# Patient Record
(demographics unavailable — no encounter records)

---

## 2024-10-10 NOTE — PHYSICAL EXAM

## 2024-10-10 NOTE — REVIEW OF SYSTEMS
[As Noted in HPI] : as noted in HPI [Arthralgias (joint pain)] : arthralgias [Negative] : Heme/Lymph [Abdominal Pain] : no abdominal pain [Constipation] : no constipation [Bloating (gassiness)] : bloating

## 2024-10-10 NOTE — HISTORY OF PRESENT ILLNESS
[de-identified] : 2017([facility]) gastritis 2023 gastritis [FreeTextEntry1] : 2014 ([facility]) diverticulosis 2023 divertciulosis

## 2024-10-10 NOTE — CONSULT LETTER
[Dear  ___] : Dear  [unfilled], [Consult Letter:] : I had the pleasure of evaluating your patient, [unfilled]. [Please see my note below.] : Please see my note below. [Consult Closing:] : Thank you very much for allowing me to participate in the care of this patient.  If you have any questions, please do not hesitate to contact me. [Sincerely,] : Sincerely, [FreeTextEntry3] : Griffin Cheney MD, FACP, AGAF, FAASLD\par   of Medicine\par  [facility]\par

## 2024-10-10 NOTE — ASSESSMENT
[FreeTextEntry1] : 73year-old female history of obesity with recent acute constipation.  She changed her diet, increased her exercise, started nocturnal Metamucil and a daily stool softener now has regular bowel movements.She used one enema.  There is no rectal bleeding.  She states her last colonoscopy was in  with Dr. Coronado.There is no family history of colon cancer.  Her brother  of liver cancer.  She denies any shortness of breath or chest pain.  She is using pantoprazole for GERD.  Endoscopy demonstrated no hiatal hernia and no barretts esophagus  Pantoprazole has controlled her gerd. Life-style changes wrt diet did not help.  Returns in follow-up 24.Due for colonoscopy as last colonscopy was in  with Dr. Coronado. GERD resolved. Has regular bms using metamucil. A1c 6%. Weight is stable.  RTO 10/10/24-reviewed colonoscopy from this year with diverticulosis; EGD  with gastritis. No gerd on panoprazole. States DM well controlled No weight loss. COmplains of eructation and bloating. Has regular bms.  IMP: 1.bloating, eructation,  2. Diverticulosis 3. DM 4. Obesity  PLAN: 1. weight loss advised 2. maintain pantoprazole renewed 3. Trial of doxycyline 100 mg po bid for erucation, to decrease bacteria load in intestine pt encouraged to call in 2 weeks to report on sxs.

## 2024-11-14 NOTE — PHYSICAL EXAM
[de-identified] : Left shoulder exam  Inspection: No malalignment, No defects, No atrophy Skin: No masses, No lesions Neck: Negative Spurling's, full ROM, no pain with ROM AROM: FF to 170, abduction to 80, ER to 40, IR to hip Painful arc ROM: none Tenderness: no bicipital tenderness, no tenderness to the greater tuberosity/RTC insertion, no anterior shoulder/lesser tuberosity tenderness Strength: 5/5 ER, 5/5 IR in adduction, 3+/5 supraspinatus testing with pain, negative [place]'s test AC Joint: No ttp/pain with cross arm testing Biceps: Speed Negative, [place] Negative Impingement test: + Epps, +Neer  Stability: Stable Vasc: 2+ radial pulse Neuro: AIN, PIN, Ulnar nerve intact to motor Sensation: Intact to light touch throughout [de-identified] : The following radiographs were ordered and read by me during this patients visit. I reviewed each radiograph in detail with the patient and discussed the findings as highlighted below.   3 views of the left shoulder were obtained, 11/14/2024, that show no acute fracture or dislocation. There is trace glenohumeral and moderate AC joint degenerative change seen. Type II acromion. There is no significant malalignment. No significant other obvious osseous abnormality, otherwise unremarkable.   MRI left shoulder dated 04/20/2024 shows mild to moderate supraspinatus and infraspinatus tendinosis without high-grade tear. There is mild subscapularis tendinosis with superimposed degenerative tearing involving the upper one third of the enthesis.  We independently reviewed and discussed in detail the images and the radiologic reports with the patient.

## 2024-11-14 NOTE — HISTORY OF PRESENT ILLNESS
[de-identified] : 75 year old female presents today with worsening of chronic left shoulder pain s/p fall 3/2024 while in [place]. Patient was seen in 2020 for bilateral shoulder pain, referred to PT and was not having pain until her recent fall. Patient was seen by Dr. Hudson, received steroid injection with out relief. MRI was obtained  dx with RTC tear, referred for PT. Pain resolved for 2 months following PT however she had reoccurrence of pain in August 2024. She has been doing PT since August with temporary relief. The pain has been progressively worsening now effecting ADLs and Sleep. NSAIDs has not been effective in reducing pain.   The patient's past medical history, past surgical history, medications and allergies were reviewed by me today with the patient and documented accordingly. In addition, the patient's family and social history, which were noncontributory to this visit were reviewed also.

## 2024-11-14 NOTE — ADDENDUM
[FreeTextEntry1] : This note was written by Adriana Cid on 11/14/2024 acting solely as a scribe for Dr. Hamzah Espitia.   All medical record entries made by the Scribe were at my, Dr. Hamzah Espitia, direction and personally dictated by me on 11/14/2024. I have personally reviewed the chart and agree that the record accurately reflects my personal performance of the history, physical exam, assessment and plan.

## 2024-11-14 NOTE — DISCUSSION/SUMMARY
[de-identified] : 74 y/o female with left shoulder pain.  Patient presents with chronic left shoulder pain. MRI left shoulder dated 04/20/024 shows mild to moderate supraspinatus and infraspinatus tendinosis without high-grade tear. There is mild subscapularis tendinosis with superimposed degenerative tearing involving the upper one third of the enthesis. A discussion was had with the patient regarding rotator cuff tendon dysfunction (including tendonitis vs. internal structural damage), subdeltoid/ subacromial bursitis, or impingement of the rotator cuff at the acromion. Other contributing sources of pain may be the acromioclavicular joint or long head of the biceps tendon.   We discussed short-term and long-term outcomes as well as the goal of treatment to reduce pain and restore function. Nonsurgical treatment is typically first-line therapy that may take weeks to months to resolve symptoms; includes rest from overhead activities, NSAIDs, home exercise program versus physical therapy to restore normal strength/ROM/function of the shoulder, and possible corticosteroid injection. Also discussed the role of advanced imaging to determine whether surgical intervention would be warranted if nonsurgical treatment does no adequately relieve pain and improve function.      Recommendations: Continue PT, Rx given. Conservative modalities as above (overhead activity rest/activity avoidance until less symptomatic, ice, NSAIDs (Meloxicam Rx given), strengthening and conditioning program).     Follow-up: if symptoms progress or persist for additional treatment as needed.

## 2024-12-12 NOTE — HISTORY OF PRESENT ILLNESS
[FreeTextEntry1] : Ms. AVELAR is a 73-year-old female who returns today in follow up with regard to a history of type 2 diabetes mellitus.  There is no known history of retinopathy, or nephropathy. With regard to neuropathy, she denies any neurologic s/s.  Current dm medication include Januvia 100mg daily.   Home glucose monitoring has shown values of late to be running -115.  She does deny any significant hypoglycemic signs and symptoms of late.  POCT A1C returned today at 5.5%.  POCT Glucose returned today 93 mg/dL   Has lost weight since meeting with RD in the past. States that she has been regaining weight. Would consider GLP1 agent in the future but for now is hesitant due to potential side effects.  Her current weight is 182 pounds (stated).   She denies any chest pain, sob, neurologic or ophthalmologic complaints. She too denies any new podiatric concerns. She is up to date with his ophthalmologic visit with Dr. Mosqueda- no diabetic changes noted.  ____________________________________________________________________________________________________  Additional medical history includes that of hyperlipidemia, vitamin d deficiency along with hypercalcemia x 1 in September of 2022 with calcium at 11.2. Calcium has been intermittently elevated in serum at 10.8 with low end PTH and w/u otherwise neg. - Per latest egd per Dr. Cheney- dx gastritis. - Follows with urologist, Dr. Baca with routine renal US  Additional Medications: Atorvastatin, Fenofibrate, Omeprazole Supplements: vitamin D3 2,000 two tabs alt one tab QOD, vitamin C - Was noted low folate and now on replacement.

## 2024-12-12 NOTE — ADDENDUM
[FreeTextEntry1] : POCT glucose testing and Hemoglobin A1c was carried out today given diabetes diagnosis. Blood will be drawn in office today.  This note was written by Earnest Munoz on 12/12/2024 acting as medical scribe for Dr. Mateo Layne. I, Dr. Mateo Layne, have read and attest that all the information, medical decision making and discharge instructions within are true and accurate.
alert

## 2025-01-15 NOTE — ADDENDUM
[FreeTextEntry1] : This note was written by Joy William on 01/15/2025 acting as medical scribe for Dr. Gris Mercado. I, Dr. Gris Mercado, have read and attest that all the information, medical decision making and discharge instructions within are true and accurate.

## 2025-01-15 NOTE — HISTORY OF PRESENT ILLNESS
[FreeTextEntry1] : f/u [de-identified] : Ms. AVELAR is a 74 year old F with PMHx of anxiety, anemia, HTN, HLD, DM, presenting for f/u. Pt has been sick since 12/22 and went to urgent care twice. On first visit, pt was given steroids but sx did not improve. Pt went back to urgent care on 1/9 and was given albuterol and benzonatate prn. Pt still has cough and phlegm. Denies chest pain, sob, colon, dizziness, diaphoresis, palpitations, LE swelling, orthopnea, syncope, n/v, headache.

## 2025-01-15 NOTE — HEALTH RISK ASSESSMENT
[0] : 2) Feeling down, depressed, or hopeless: Not at all (0) [PHQ-2 Negative - No further assessment needed] : PHQ-2 Negative - No further assessment needed [Never] : Never [[id number]] : 0

## 2025-02-13 NOTE — ASSESSMENT
[FreeTextEntry1] : 74year-old female history of obesity with recent acute constipation.  She changed her diet, increased her exercise, started nocturnal Metamucil and a daily stool softener now has regular bowel movements.She used one enema.  There is no rectal bleeding.  She states her last colonoscopy was in  with Dr. Coronado.There is no family history of colon cancer.  Her brother  of liver cancer.  She denies any shortness of breath or chest pain.  She is using pantoprazole for GERD.  Endoscopy demonstrated no hiatal hernia and no barretts esophagus  Pantoprazole has controlled her gerd. Life-style changes wrt diet did not help.  Returns in follow-up 24.Due for colonoscopy as last colonscopy was in  with Dr. Coronado. GERD resolved. Has regular bms using metamucil. A1c 6%. Weight is stable.  RTO 10/10/24-reviewed colonoscopy from this year with diverticulosis; EGD  with gastritis. No gerd on panoprazole. States DM well controlled No weight loss. COmplains of eructation and bloating. Has regular bms.  RTO 25- recent bronchitis. Labs reviewed from - normal cbc, cmp. NO more eructation, has regular bms. Using Probiotics and fiber.  IMP: 1.resolved constipation 2. Diverticulosis 3. DM 4. Obesity  PLAN: 1. weight loss advised 2. maintain high fiber diet 3. RTO 6months

## 2025-02-13 NOTE — HISTORY OF PRESENT ILLNESS
[de-identified] : 2017([facility]) gastritis 2023 gastritis [FreeTextEntry1] : 2014 ([facility]) diverticulosis 2023 divertciulosis

## 2025-02-13 NOTE — REVIEW OF SYSTEMS
[As Noted in HPI] : as noted in HPI [Arthralgias (joint pain)] : arthralgias [Negative] : Heme/Lymph [Abdominal Pain] : no abdominal pain [Constipation] : no constipation

## 2025-02-13 NOTE — PHYSICAL EXAM

## 2025-02-13 NOTE — CONSULT LETTER
[Dear  ___] : Dear  [unfilled], [Consult Letter:] : I had the pleasure of evaluating your patient, [unfilled]. [Please see my note below.] : Please see my note below. [Consult Closing:] : Thank you very much for allowing me to participate in the care of this patient.  If you have any questions, please do not hesitate to contact me. [Sincerely,] : Sincerely, [FreeTextEntry3] : Griffin Cheney MD, FACP, AGAF, FAASLD\par   of Medicine\par  [facility]\par

## 2025-03-09 NOTE — DISCUSSION/SUMMARY
[de-identified] : 73 y/o female with bilateral shoulder pain; L>R.  Patient presents for follow-up of bilateral shoulder with known RTC pathology on the left shoulder from previous MRI. Discussed presentation is consistent with exacerbation of her degenerative shoulder and RTC dysfunction. Again discussed short-term and long-term outcomes as well as the goal of treatment to reduce pain and restore function. Nonsurgical treatment is typically first-line therapy that may take weeks to months to resolve symptoms; includes rest from overhead activities, NSAIDs, home exercise program versus physical therapy to restore normal strength/ROM/function of the shoulder, and possible corticosteroid injection. Also discussed the role of arthroscopic surgical intervention when nonsurgical treatment does not adequately relieve pain/inflammation.   Recommendations: Conservative modalities as above (overhead activity rest/activity avoidance until less symptomatic, ice, NSAIDs if tolerated, home exercise strengthening and stretching program).   Physical therapy Rx given for shoulder/RTC strengthening and conditioning program   Follow-up as needed if symptoms persist or worsen.

## 2025-03-09 NOTE — HISTORY OF PRESENT ILLNESS
[de-identified] : 74-year-old right-hand dominant female with a history of bilateral shoulder pain, now presenting with acute exacerbation of chronic bilateral shoulder pain for the past two weeks, left greater than right.  She was seen for left shoulder pain in November 2024, completed PT in December, and reports doing well until two weeks ago.  Attributes current pain exacerbation to recent travel and lifting suitcases.  Takes Tylenol with some relief.  Denies numbness, tingling, or weakness.

## 2025-03-09 NOTE — HISTORY OF PRESENT ILLNESS
[de-identified] : 74-year-old right-hand dominant female with a history of bilateral shoulder pain, now presenting with acute exacerbation of chronic bilateral shoulder pain for the past two weeks, left greater than right.  She was seen for left shoulder pain in November 2024, completed PT in December, and reports doing well until two weeks ago.  Attributes current pain exacerbation to recent travel and lifting suitcases.  Takes Tylenol with some relief.  Denies numbness, tingling, or weakness.

## 2025-03-09 NOTE — DISCUSSION/SUMMARY
[de-identified] : 73 y/o female with bilateral shoulder pain; L>R.  Patient presents for follow-up of bilateral shoulder with known RTC pathology on the left shoulder from previous MRI. Discussed presentation is consistent with exacerbation of her degenerative shoulder and RTC dysfunction. Again discussed short-term and long-term outcomes as well as the goal of treatment to reduce pain and restore function. Nonsurgical treatment is typically first-line therapy that may take weeks to months to resolve symptoms; includes rest from overhead activities, NSAIDs, home exercise program versus physical therapy to restore normal strength/ROM/function of the shoulder, and possible corticosteroid injection. Also discussed the role of arthroscopic surgical intervention when nonsurgical treatment does not adequately relieve pain/inflammation.   Recommendations: Conservative modalities as above (overhead activity rest/activity avoidance until less symptomatic, ice, NSAIDs if tolerated, home exercise strengthening and stretching program).   Physical therapy Rx given for shoulder/RTC strengthening and conditioning program   Follow-up as needed if symptoms persist or worsen.

## 2025-03-09 NOTE — PHYSICAL EXAM
[de-identified] : Left shoulder exam  Inspection: No malalignment, No defects, No atrophy Skin: No masses, No lesions Neck: Negative Spurling's, full ROM, no pain with ROM AROM: FF to 180, abduction to 90, ER to 80, IR to low lumbar Painful arc ROM: IR Tenderness: no bicipital tenderness, no tenderness to the greater tuberosity/RTC insertion, no anterior shoulder/lesser tuberosity tenderness Strength: 5/5 ER, 5/5 IR in adduction, 3+/5 supraspinatus testing with pain, negative [place]'s test AC Joint: No ttp/pain with cross arm testing Biceps: Speed Negative, [place]ons Negative Impingement test: + Epps, +Neer  Stability: Stable Vasc: 2+ radial pulse Neuro: AIN, PIN, Ulnar nerve intact to motor Sensation: Intact to light touch throughout  Right shoulder exam  Inspection: No malalignment, No defects, No atrophy Skin: No masses, No lesions Neck: Negative Spurling's, full ROM, no pain with ROM AROM: FF to 180, abduction to 90, ER to 80, IR to low lumbar Painful arc ROM: none Tenderness: no bicipital tenderness, no tenderness to the greater tuberosity/RTC insertion, no anterior shoulder/lesser tuberosity tenderness Strength: 5/5 ER, 5/5 IR in adduction, 5/5 supraspinatus testing, negative [place]'s test AC Joint: No ttp/pain with cross arm testing Biceps: Speed Negative, [place]ons Negative Impingement test: Negative Epps, Negative Neer  Stability: Stable Vasc: 2+ radial pulse Neuro: AIN, PIN, Ulnar nerve intact to motor Sensation: Intact to light touch throughout [de-identified] : The following radiographs were ordered and read by me during this patients visit. I reviewed each radiograph in detail with the patient and discussed the findings as highlighted below.   3 views of the left shoulder were obtained, 11/14/2024, that show no acute fracture or dislocation. There is trace glenohumeral and moderate AC joint degenerative change seen. Type II acromion. There is no significant malalignment. No significant other obvious osseous abnormality, otherwise unremarkable.   MRI left shoulder dated 04/20/2024 shows mild to moderate supraspinatus and infraspinatus tendinosis without high-grade tear. There is mild subscapularis tendinosis with superimposed degenerative tearing involving the upper one third of the enthesis.  We independently reviewed and discussed in detail the images and the radiologic reports with the patient.

## 2025-03-09 NOTE — PHYSICAL EXAM
[de-identified] : Left shoulder exam  Inspection: No malalignment, No defects, No atrophy Skin: No masses, No lesions Neck: Negative Spurling's, full ROM, no pain with ROM AROM: FF to 180, abduction to 90, ER to 80, IR to low lumbar Painful arc ROM: IR Tenderness: no bicipital tenderness, no tenderness to the greater tuberosity/RTC insertion, no anterior shoulder/lesser tuberosity tenderness Strength: 5/5 ER, 5/5 IR in adduction, 3+/5 supraspinatus testing with pain, negative [place]'s test AC Joint: No ttp/pain with cross arm testing Biceps: Speed Negative, [place] Negative Impingement test: + Epps, +Neer  Stability: Stable Vasc: 2+ radial pulse Neuro: AIN, PIN, Ulnar nerve intact to motor Sensation: Intact to light touch throughout  Right shoulder exam  Inspection: No malalignment, No defects, No atrophy Skin: No masses, No lesions Neck: Negative Spurling's, full ROM, no pain with ROM AROM: FF to 180, abduction to 90, ER to 80, IR to low lumbar Painful arc ROM: none Tenderness: no bicipital tenderness, no tenderness to the greater tuberosity/RTC insertion, no anterior shoulder/lesser tuberosity tenderness Strength: 5/5 ER, 5/5 IR in adduction, 5/5 supraspinatus testing, negative [place]'s test AC Joint: No ttp/pain with cross arm testing Biceps: Speed Negative, [place] Negative Impingement test: Negative Epps, Negative Neer  Stability: Stable Vasc: 2+ radial pulse Neuro: AIN, PIN, Ulnar nerve intact to motor Sensation: Intact to light touch throughout [de-identified] : The following radiographs were ordered and read by me during this patients visit. I reviewed each radiograph in detail with the patient and discussed the findings as highlighted below.   3 views of the left shoulder were obtained, 11/14/2024, that show no acute fracture or dislocation. There is trace glenohumeral and moderate AC joint degenerative change seen. Type II acromion. There is no significant malalignment. No significant other obvious osseous abnormality, otherwise unremarkable.   MRI left shoulder dated 04/20/2024 shows mild to moderate supraspinatus and infraspinatus tendinosis without high-grade tear. There is mild subscapularis tendinosis with superimposed degenerative tearing involving the upper one third of the enthesis.  We independently reviewed and discussed in detail the images and the radiologic reports with the patient.

## 2025-03-09 NOTE — ADDENDUM
[FreeTextEntry1] : This note was written by Adriana Cid on 03/06/2025 acting solely as a scribe for Dr. Hamzah Espitia.   All medical record entries made by the Scribe were at my, Dr. Hamzah Espitia, direction and personally dictated by me on 03/06/2025. I have personally reviewed the chart and agree that the record accurately reflects my personal performance of the history, physical exam, assessment and plan.

## 2025-03-27 NOTE — CONSULT LETTER
[Dear  ___] : Dear  [unfilled], [Consult Letter:] : I had the pleasure of evaluating your patient, [unfilled]. [Please see my note below.] : Please see my note below. [Consult Closing:] : Thank you very much for allowing me to participate in the care of this patient.  If you have any questions, please do not hesitate to contact me. [Sincerely,] : Sincerely, [FreeTextEntry3] : Ailyn Chowdhury MD Otolaryngology and Cranial Base Surgery  Attending Physician- Department of Otolaryngology and Head & Neck Surgery   -[employer] [facility] Office: [phone]  Fax: [phone]

## 2025-03-27 NOTE — PROCEDURE
[FreeTextEntry3] : Cerumen removed with curette and forceps form the right ear. After removal of cerumen canal noted to be normal without edema, purulence or inflammation. Patient tolerated procedure well.

## 2025-03-27 NOTE — END OF VISIT
[FreeTextEntry3] : I personally saw and examined Ms. PAUL AVELAR in detail this visit today. I personally reviewed the HPI, PMH, FH, SH, ROS and medications/allergies. I have spoken to TAI Lawrence regarding the history and have personally determined the assessment and plan of care, and documented this myself. I was present and participated in all key portions of the encounter and all procedures noted above. I have made changes in the body of the note where appropriate.   Attesting Faculty: See Attending Signature Below

## 2025-03-27 NOTE — HISTORY OF PRESENT ILLNESS
[de-identified] : 73y/o female w/ hx of seasonal allergies, bronchitis and asthma who came in with nasal dryness and sneezing a lot in the am. She is currently using Flonase now that is helping her. She used to take singular.  She is not taking any oral antihistamines at this time. She used to see ENT Dr. Patel but now wanted to be establish a new ENT. She has no ear or throat complaints. She had an audio last year

## 2025-03-27 NOTE — ASSESSMENT
[FreeTextEntry1] : 73y/o female w/ hx of seasonal allergies, bronchitis and asthma who came in with nasal dryness and sneezing a lot in the am  Allergic Rhinitis -Continue Flonase one spray bid as it is helping her  Wax right ear canal -Wax removed today -Defers audio today no change in hearing. Last hearing test was last year (she will have it faxed to us) -F/u annually

## 2025-04-02 NOTE — HEALTH RISK ASSESSMENT
[0] : 2) Feeling down, depressed, or hopeless: Not at all (0) [PHQ-2 Negative - No further assessment needed] : PHQ-2 Negative - No further assessment needed [Never] : Never [[id number]] : 0

## 2025-04-02 NOTE — HISTORY OF PRESENT ILLNESS
[FreeTextEntry1] : 6 month f/u [de-identified] : Ms. AVELAR is a 74 year old F with PMHx of anxiety, anemia, HTN, HLD, DM, presenting for f/u. Pt is currently doing physical therapy for herniated disk and arthritis in back. Ortho gave meloxicam for lower back but it did not really help much Pt saw vasc Dr. Govea and doppler showed acute occlusive SVT in great saphenous vein, rec conservative therapy and pt reports pain has improved with warm compress. Denies chest pain, sob, colon, dizziness, diaphoresis, palpitations, LE swelling, orthopnea, syncope, n/v, headache.

## 2025-04-02 NOTE — HISTORY OF PRESENT ILLNESS
[FreeTextEntry1] : 6 month f/u [de-identified] : Ms. AVELAR is a 74 year old F with PMHx of anxiety, anemia, HTN, HLD, DM, presenting for f/u. Pt is currently doing physical therapy for herniated disk and arthritis in back. Ortho gave meloxicam for lower back but it did not really help much Pt saw vasc Dr. Govea and doppler showed acute occlusive SVT in great saphenous vein, rec conservative therapy and pt reports pain has improved with warm compress. Denies chest pain, sob, colon, dizziness, diaphoresis, palpitations, LE swelling, orthopnea, syncope, n/v, headache.

## 2025-04-02 NOTE — HISTORY OF PRESENT ILLNESS
[FreeTextEntry1] : 6 month f/u [de-identified] : Ms. AVELAR is a 74 year old F with PMHx of anxiety, anemia, HTN, HLD, DM, presenting for f/u. Pt is currently doing physical therapy for herniated disk and arthritis in back. Ortho gave meloxicam for lower back but it did not really help much Pt saw vasc Dr. Govea and doppler showed acute occlusive SVT in great saphenous vein, rec conservative therapy and pt reports pain has improved with warm compress. Denies chest pain, sob, colon, dizziness, diaphoresis, palpitations, LE swelling, orthopnea, syncope, n/v, headache.

## 2025-04-02 NOTE — HEALTH RISK ASSESSMENT
[0] : 2) Feeling down, depressed, or hopeless: Not at all (0) [PHQ-2 Negative - No further assessment needed] : PHQ-2 Negative - No further assessment needed [Never] : Never [[id number]] : 0

## 2025-04-02 NOTE — HEALTH RISK ASSESSMENT
HISTORY OF PRESENT ILLNESS:   Fernanda Camejo is a 47 year old female who comes in today for lipid screening, BMI and blood pressure screening. No other complaints. Would like referral for derm as she has a skin tag on her eye that she wants removed.    No current outpatient medications on file prior to visit.  No current facility-administered medications on file prior to visit.     ALLERGIES:   Allergen Reactions   • Sulfa Antibiotics      rash, swollen tongue      reports that she has never smoked. She has never used smokeless tobacco.       REVIEW OF SYSTEMS:    A three point review of systems was performed. All pertinent positives and negatives were noted in the History of Present Illness.      PHYSICAL EXAMINATION:  Bp:118/78  Heart:regular rate and rhythm and normal S1 and S2  Lungs:lungs are clear to auscultation     Test Results:  Please enter a base name.   Office Visit on 10/08/2020   Component Date Value Ref Range Status   • Glucose Blood, POC 10/08/2020 99  65 - 99 mg/dL Final   • FASTING STATUS, POC 10/08/2020 12HR   Final   • HDL CHOLESTEROL, POC 10/08/2020 58   Final   • TRIGLYCERIDES, POC 10/08/2020 50   Final   • LDL CHOLESTEROL, POC 10/08/2020 87   Final   • Total Cholesterol/HDL Ratio, POC 10/08/2020 155   Final   • Glucose Blood, POC 10/08/2020 99  65 - 99 mg/dL Final   • FASTING STATUS, POC 10/08/2020 12   Final         ASSESSMENT:  1. Encounter for biometric screening    2. Skin tag          PLAN:  Orders Placed This Encounter   • SERVICE TO DERMATOLOGY   • POCT Blood Sugar Hand Held Device   • POCT Lipid Profile, In-Office     Patient education materials given.  Biometric form signed and given.    SAVANNAH Martinez     [0] : 2) Feeling down, depressed, or hopeless: Not at all (0) [PHQ-2 Negative - No further assessment needed] : PHQ-2 Negative - No further assessment needed [Never] : Never [[id number]] : 0

## 2025-04-02 NOTE — ADDENDUM
[FreeTextEntry1] : This note was written by Joy William on 04/02/2025 acting as medical scribe for Dr. Gris Mercado. I, Dr. Gris Mercado, have read and attest that all the information, medical decision making and discharge instructions within are true and accurate.

## 2025-04-03 NOTE — ASSESSMENT
[FreeTextEntry1] : 1) Nevi/acrochordons - Counseled about clinically benign lesions - Discussed regular OTC sunscreen use, SPF 30 or higher   2) Skin cancer Screening - No lesions clinically concerning for malignancy today - Discussed regular self skin checks and ABCDEs of skin cancer screening - Discussed regular sunscreen use - Pt instructed to report any new or changing lesions  RTC 1 yr for FBSE or sooner if any concerns

## 2025-04-03 NOTE — HISTORY OF PRESENT ILLNESS
[FreeTextEntry1] : bumps [de-identified] : 75 y/o F presenting for bumps on the neck and a mole check.  No personal or FH of skin cancer.

## 2025-04-03 NOTE — PHYSICAL EXAM
[Full Body Skin Exam Performed] : performed [FreeTextEntry3] : Fairly uniform and regular brown macules and papules on the trunk and extremities  pedunculated skin-colored to brown papules on the neck

## 2025-04-22 NOTE — HISTORY OF PRESENT ILLNESS
[FreeTextEntry1] : Alyssa is a 72 y/o female here for follow up visit.   Colonoscopy scheduled in April with Dr. Cheney, last one 10 years ago.  CT A/P 01/09/24 (LLQ pain, r/o diverticulitis) - Acute uncomplicated diverticulitis of the sigmoid colon.  US Abdomen 01/15/24 by Dr. Mercado (Indeterminate liver lesion) - Indeterminate hyperechoic liver lesion could represent a small hemangioma or other primary liver lesion. It may be further assessed and characterized with contrast-enhanced MRI liver, as an outpatient, if clinically indicated.  MR Abdomen 01/22/24 (f/o liver lesion, r/o metastasis) - The previously noted 8 mm hypoattenuating left hepatic lobe lesion corresponds to a small cyst. No additional workup or follow-up is necessary. No other hepatic lesions.  Last seen on 1/30/24 - In summary the patient had an episode of uncomplicated sigmoid diverticulitis earlier this month. Her symptoms resolved with oral antibiotics over the course of a few days. This was her first episode. Her last colonoscopy was in 2014 and demonstrated diverticulosis. She recently underwent an MRI of the abdomen that demonstrated a benign hepatic cyst. I reassured the patient that there is no indication for surgery. As long as she does not develop recurrent diverticulitis near future mildly recommendation would be for a high-fiber diet and she will be following up with Dr. Cheney in the next few months for a screening colonoscopy. From my standpoint I only need to see her as needed. [de-identified] : Alyssa is a 72 y/o female here for follow up visit, gallbladder.   US Abdomen on 4/4/25 - Mild diffuse hepatic steatosis. Small echogenic focus in the right hepatic lobe, possibly small hemangioma which was not seen previously. No other focal hepatic lesion is seen. Small layering stones/gravel within the gallbladder. Right renal parapelvic cysts.  MR Abdomen on 4/10/25 - GALLBLADDER: Small layering stones. IMPRESSION: Previously seen hepatic lesion in the right lobe the liver corresponds to a slow filling hemangioma.  Last seen on 1/30/24 - In summary the patient had an episode of uncomplicated sigmoid diverticulitis earlier this month. Her symptoms resolved with oral antibiotics over the course of a few days. This was her first episode. Her last colonoscopy was in 2014 and demonstrated diverticulosis. She recently underwent an MRI of the abdomen that demonstrated a benign hepatic cyst. I reassured the patient that there is no indication for surgery. As long as she does not develop recurrent diverticulitis near future mildly recommendation would be for a high-fiber diet and she will be following up with Dr. Cheney in the next few months for a screening colonoscopy. From my standpoint I only need to see her as needed.  Colonoscopy on 6/13/24 - The distal rectum and anal verge are normal on retroflexion view. Diverticulosis in the sigmoid colon, in the ascending colon and in the descending colon. No specimens collected.   Today pt denies having any RUQ/epigastric/right lower back pain. Deines nausea/vomiting, normal BMs, no fevers/chills, good appetite.

## 2025-04-22 NOTE — PHYSICAL EXAM
[Normal Breath Sounds] : Normal breath sounds [Normal Rate and Rhythm] : normal rate and rhythm [No Rash or Lesion] : No rash or lesion [Alert] : alert [Calm] : calm [de-identified] : nad [de-identified] : Obese, soft, nontender, no palpable masses or hernias.  No jaundice or scleral icterus.  No Aranda sign. [de-identified] : nl

## 2025-04-22 NOTE — PHYSICAL EXAM
[Normal Breath Sounds] : Normal breath sounds [Normal Rate and Rhythm] : normal rate and rhythm [No Rash or Lesion] : No rash or lesion [Alert] : alert [Calm] : calm [de-identified] : nad [de-identified] : Obese, soft, nontender, no palpable masses or hernias.  No jaundice or scleral icterus.  No Aranda sign. [de-identified] : nl

## 2025-04-22 NOTE — CONSULT LETTER
[Dear  ___] : Dear  [unfilled], [Consult Letter:] : I had the pleasure of evaluating your patient, [unfilled]. [Please see my note below.] : Please see my note below. [Consult Closing:] : Thank you very much for allowing me to participate in the care of this patient.  If you have any questions, please do not hesitate to contact me. [Sincerely,] : Sincerely, [FreeTextEntry3] : Tad Estrella M.D., F.A.C.S, F.A.S.C.R.S

## 2025-04-22 NOTE — PHYSICAL EXAM
[Normal Breath Sounds] : Normal breath sounds [Normal Rate and Rhythm] : normal rate and rhythm [No Rash or Lesion] : No rash or lesion [Alert] : alert [Calm] : calm [de-identified] : nad [de-identified] : Obese, soft, nontender, no palpable masses or hernias.  No jaundice or scleral icterus.  No Aranda sign. [de-identified] : nl

## 2025-04-22 NOTE — ASSESSMENT
[FreeTextEntry1] : In summary the patient presents for evaluation of newly diagnosed gallstones.  Recently she had an ultrasound of the abdomen which demonstrates small layering stones/gravel.  An MRI of the abdomen demonstrates layering gallstones and no evidence of choledocholithiasis.  Her main complaint is left-sided shoulder and back pain.  She denies nausea vomiting.  She denies right upper quadrant pain.  She denies abdominal pain after eating.  Most recent liver function tests are normal.  She has had a minimally elevated alkaline phosphatase in the past.  Her symptoms are not typical of biliary colic and she has no evidence of cholecystitis or choledocholithiasis.  I therefore recommended conservative/nonoperative treatment of her gallstones unless they become symptomatic.

## 2025-04-22 NOTE — HISTORY OF PRESENT ILLNESS
[FreeTextEntry1] : Alyssa is a 74 y/o female here for follow up visit.   Colonoscopy scheduled in April with Dr. Cheney, last one 10 years ago.  CT A/P 01/09/24 (LLQ pain, r/o diverticulitis) - Acute uncomplicated diverticulitis of the sigmoid colon.  US Abdomen 01/15/24 by Dr. Mercado (Indeterminate liver lesion) - Indeterminate hyperechoic liver lesion could represent a small hemangioma or other primary liver lesion. It may be further assessed and characterized with contrast-enhanced MRI liver, as an outpatient, if clinically indicated.  MR Abdomen 01/22/24 (f/o liver lesion, r/o metastasis) - The previously noted 8 mm hypoattenuating left hepatic lobe lesion corresponds to a small cyst. No additional workup or follow-up is necessary. No other hepatic lesions.  Last seen on 1/30/24 - In summary the patient had an episode of uncomplicated sigmoid diverticulitis earlier this month. Her symptoms resolved with oral antibiotics over the course of a few days. This was her first episode. Her last colonoscopy was in 2014 and demonstrated diverticulosis. She recently underwent an MRI of the abdomen that demonstrated a benign hepatic cyst. I reassured the patient that there is no indication for surgery. As long as she does not develop recurrent diverticulitis near future mildly recommendation would be for a high-fiber diet and she will be following up with Dr. Cheney in the next few months for a screening colonoscopy. From my standpoint I only need to see her as needed. [de-identified] : Alyssa is a 74 y/o female here for follow up visit, gallbladder.   US Abdomen on 4/4/25 - Mild diffuse hepatic steatosis. Small echogenic focus in the right hepatic lobe, possibly small hemangioma which was not seen previously. No other focal hepatic lesion is seen. Small layering stones/gravel within the gallbladder. Right renal parapelvic cysts.  MR Abdomen on 4/10/25 - GALLBLADDER: Small layering stones. IMPRESSION: Previously seen hepatic lesion in the right lobe the liver corresponds to a slow filling hemangioma.  Last seen on 1/30/24 - In summary the patient had an episode of uncomplicated sigmoid diverticulitis earlier this month. Her symptoms resolved with oral antibiotics over the course of a few days. This was her first episode. Her last colonoscopy was in 2014 and demonstrated diverticulosis. She recently underwent an MRI of the abdomen that demonstrated a benign hepatic cyst. I reassured the patient that there is no indication for surgery. As long as she does not develop recurrent diverticulitis near future mildly recommendation would be for a high-fiber diet and she will be following up with Dr. Cheney in the next few months for a screening colonoscopy. From my standpoint I only need to see her as needed.  Colonoscopy on 6/13/24 - The distal rectum and anal verge are normal on retroflexion view. Diverticulosis in the sigmoid colon, in the ascending colon and in the descending colon. No specimens collected.   Today pt denies having any RUQ/epigastric/right lower back pain. Deines nausea/vomiting, normal BMs, no fevers/chills, good appetite.

## 2025-04-22 NOTE — HISTORY OF PRESENT ILLNESS
[FreeTextEntry1] : Alyssa is a 72 y/o female here for follow up visit.   Colonoscopy scheduled in April with Dr. Cheney, last one 10 years ago.  CT A/P 01/09/24 (LLQ pain, r/o diverticulitis) - Acute uncomplicated diverticulitis of the sigmoid colon.  US Abdomen 01/15/24 by Dr. Mercado (Indeterminate liver lesion) - Indeterminate hyperechoic liver lesion could represent a small hemangioma or other primary liver lesion. It may be further assessed and characterized with contrast-enhanced MRI liver, as an outpatient, if clinically indicated.  MR Abdomen 01/22/24 (f/o liver lesion, r/o metastasis) - The previously noted 8 mm hypoattenuating left hepatic lobe lesion corresponds to a small cyst. No additional workup or follow-up is necessary. No other hepatic lesions.  Last seen on 1/30/24 - In summary the patient had an episode of uncomplicated sigmoid diverticulitis earlier this month. Her symptoms resolved with oral antibiotics over the course of a few days. This was her first episode. Her last colonoscopy was in 2014 and demonstrated diverticulosis. She recently underwent an MRI of the abdomen that demonstrated a benign hepatic cyst. I reassured the patient that there is no indication for surgery. As long as she does not develop recurrent diverticulitis near future mildly recommendation would be for a high-fiber diet and she will be following up with Dr. Cheney in the next few months for a screening colonoscopy. From my standpoint I only need to see her as needed. [de-identified] : Alyssa is a 74 y/o female here for follow up visit, gallbladder.   US Abdomen on 4/4/25 - Mild diffuse hepatic steatosis. Small echogenic focus in the right hepatic lobe, possibly small hemangioma which was not seen previously. No other focal hepatic lesion is seen. Small layering stones/gravel within the gallbladder. Right renal parapelvic cysts.  MR Abdomen on 4/10/25 - GALLBLADDER: Small layering stones. IMPRESSION: Previously seen hepatic lesion in the right lobe the liver corresponds to a slow filling hemangioma.  Last seen on 1/30/24 - In summary the patient had an episode of uncomplicated sigmoid diverticulitis earlier this month. Her symptoms resolved with oral antibiotics over the course of a few days. This was her first episode. Her last colonoscopy was in 2014 and demonstrated diverticulosis. She recently underwent an MRI of the abdomen that demonstrated a benign hepatic cyst. I reassured the patient that there is no indication for surgery. As long as she does not develop recurrent diverticulitis near future mildly recommendation would be for a high-fiber diet and she will be following up with Dr. Cheney in the next few months for a screening colonoscopy. From my standpoint I only need to see her as needed.  Colonoscopy on 6/13/24 - The distal rectum and anal verge are normal on retroflexion view. Diverticulosis in the sigmoid colon, in the ascending colon and in the descending colon. No specimens collected.   Today pt denies having any RUQ/epigastric/right lower back pain. Deines nausea/vomiting, normal BMs, no fevers/chills, good appetite.

## 2025-06-12 NOTE — HISTORY OF PRESENT ILLNESS
[FreeTextEntry1] : Ms. AVELAR is a 74-year-old female who returns today in follow up with regard to a history of type 2 diabetes mellitus.  There is no known history of retinopathy, or nephropathy. With regard to neuropathy, she denies any neurologic s/s.  Current dm medication include Januvia 100mg daily. Tolerating it well.  Home glucose monitoring has shown values of late to be running 120 and below. She does deny any significant hypoglycemic signs and symptoms of late.  POCT A1C returned today at 6%, previously 5.9% on labs 04/02/25. POCT glucose today at 114 mg/dL.  Has lost weight since meeting with [facility] in the past. States that she has been regaining weight. Would consider GLP1 agent in the future but for now is hesitant due to potential side effects.   She denies any chest pain, sob, neurologic or ophthalmologic complaints. She too denies any new podiatric concerns. She is up to date with his ophthalmologic visit with Dr. Mosqueda- no diabetic changes noted.  ____________________________________________________________________________________________________  Additional medical history includes that of hyperlipidemia, vitamin d deficiency along with hypercalcemia x 1 in September of 2022 with calcium at 11.2. Calcium has been intermittently elevated in serum at 10.8 with low end PTH and w/u otherwise neg. - Per latest egd per Dr. Cheney- dx gastritis. - Follows with urologist, Dr. Baca with routine renal US  Additional Medications: Atorvastatin 20 mg, Fenofibrate 134 mg, Pantoprazole 40 mg, ASA 81 mg Supplements: vitamin D3 5,000 IU daily, B12 2,500 mg daily, vitamin C - Was noted low folate and now on replacement.

## 2025-06-12 NOTE — ADDENDUM
[FreeTextEntry1] : POCT glucose testing and Hemoglobin A1c was carried out today given diabetes diagnosis. Blood will be drawn in office today.  This note was written by Vilma Hull on 06/12/2025 acting as medical scribe for Dr. Mateo Layne. I, Dr. Mateo Layne, have read and attest that all the information, medical decision making and discharge instructions within are true and accurate.

## 2025-07-13 NOTE — HISTORY OF PRESENT ILLNESS
[Spouse] : spouse [FreeTextEntry1] : 3 month f/u [de-identified] : Ms. AVELAR is a 74 year F with PMHX of anxiety, anemia, HTN, HLD, DM diverticulitis  presenting for 3 month f/u. Pt reports stomach bloating 2 weeks ago, having trouble passing gas and feeling pain. She then went on a liquid diet and was able to pass gas and is feeling better now. Completely resolved no abd pain, n/v, f/c, tolerating diet.  Says this was  completely different and not consistent with previous episode of diverticulitis she a few years ago. Denies chest pain, sob, colon, dizziness, diaphoresis, palpitations, LE swelling, orthopnea, syncope, n/v, headache

## 2025-07-13 NOTE — PHYSICAL EXAM
[No Acute Distress] : no acute distress [Well Nourished] : well nourished [Well Developed] : well developed [Well-Appearing] : well-appearing [Normal Sclera/Conjunctiva] : normal sclera/conjunctiva [PERRL] : pupils equal round and reactive to light [EOMI] : extraocular movements intact [Normal Outer Ear/Nose] : the outer ears and nose were normal in appearance [Normal Oropharynx] : the oropharynx was normal [No JVD] : no jugular venous distention [No Lymphadenopathy] : no lymphadenopathy [Supple] : supple [Thyroid Normal, No Nodules] : the thyroid was normal and there were no nodules present [No Respiratory Distress] : no respiratory distress  [No Accessory Muscle Use] : no accessory muscle use [Clear to Auscultation] : lungs were clear to auscultation bilaterally [Normal Rate] : normal rate  [Regular Rhythm] : with a regular rhythm [Normal S1, S2] : normal S1 and S2 [No Murmur] : no murmur heard [No Carotid Bruits] : no carotid bruits [No Varicosities] : no varicosities [Pedal Pulses Present] : the pedal pulses are present [No Edema] : there was no peripheral edema [No Extremity Clubbing/Cyanosis] : no extremity clubbing/cyanosis [Soft] : abdomen soft [Non Tender] : non-tender [Non-distended] : non-distended [Normal Bowel Sounds] : normal bowel sounds [Normal Posterior Cervical Nodes] : no posterior cervical lymphadenopathy [Normal Anterior Cervical Nodes] : no anterior cervical lymphadenopathy [No CVA Tenderness] : no CVA  tenderness [No Spinal Tenderness] : no spinal tenderness [No Joint Swelling] : no joint swelling [Grossly Normal Strength/Tone] : grossly normal strength/tone [No Rash] : no rash [Coordination Grossly Intact] : coordination grossly intact [No Focal Deficits] : no focal deficits [Normal Gait] : normal gait [Normal Affect] : the affect was normal [Normal Insight/Judgement] : insight and judgment were intact [Alert and Oriented x3] : oriented to person, place, and time [de-identified] : benign exam

## 2025-07-13 NOTE — HEALTH RISK ASSESSMENT
[0] : 2) Feeling down, depressed, or hopeless: Not at all (0) [PHQ-2 Negative - No further assessment needed] : PHQ-2 Negative - No further assessment needed [Never] : Never [[id number]] : 0

## 2025-07-13 NOTE — HISTORY OF PRESENT ILLNESS
[Spouse] : spouse [FreeTextEntry1] : 3 month f/u [de-identified] : Ms. AVELAR is a 74 year F with PMHX of anxiety, anemia, HTN, HLD, DM diverticulitis  presenting for 3 month f/u. Pt reports stomach bloating 2 weeks ago, having trouble passing gas and feeling pain. She then went on a liquid diet and was able to pass gas and is feeling better now. Completely resolved no abd pain, n/v, f/c, tolerating diet.  Says this was  completely different and not consistent with previous episode of diverticulitis she a few years ago. Denies chest pain, sob, colon, dizziness, diaphoresis, palpitations, LE swelling, orthopnea, syncope, n/v, headache

## 2025-07-13 NOTE — HISTORY OF PRESENT ILLNESS
[Spouse] : spouse [FreeTextEntry1] : 3 month f/u [de-identified] : Ms. AVELAR is a 74 year F with PMHX of anxiety, anemia, HTN, HLD, DM diverticulitis  presenting for 3 month f/u. Pt reports stomach bloating 2 weeks ago, having trouble passing gas and feeling pain. She then went on a liquid diet and was able to pass gas and is feeling better now. Completely resolved no abd pain, n/v, f/c, tolerating diet.  Says this was  completely different and not consistent with previous episode of diverticulitis she a few years ago. Denies chest pain, sob, colon, dizziness, diaphoresis, palpitations, LE swelling, orthopnea, syncope, n/v, headache  .

## 2025-07-13 NOTE — PHYSICAL EXAM
[No Acute Distress] : no acute distress [Well Nourished] : well nourished [Well Developed] : well developed [Well-Appearing] : well-appearing [Normal Sclera/Conjunctiva] : normal sclera/conjunctiva [PERRL] : pupils equal round and reactive to light [EOMI] : extraocular movements intact [Normal Outer Ear/Nose] : the outer ears and nose were normal in appearance [Normal Oropharynx] : the oropharynx was normal [No JVD] : no jugular venous distention [No Lymphadenopathy] : no lymphadenopathy [Supple] : supple [Thyroid Normal, No Nodules] : the thyroid was normal and there were no nodules present [No Respiratory Distress] : no respiratory distress  [No Accessory Muscle Use] : no accessory muscle use [Clear to Auscultation] : lungs were clear to auscultation bilaterally [Normal Rate] : normal rate  [Regular Rhythm] : with a regular rhythm [Normal S1, S2] : normal S1 and S2 [No Murmur] : no murmur heard [No Carotid Bruits] : no carotid bruits [No Varicosities] : no varicosities [Pedal Pulses Present] : the pedal pulses are present [No Edema] : there was no peripheral edema [No Extremity Clubbing/Cyanosis] : no extremity clubbing/cyanosis [Soft] : abdomen soft [Non Tender] : non-tender [Non-distended] : non-distended [Normal Bowel Sounds] : normal bowel sounds [Normal Posterior Cervical Nodes] : no posterior cervical lymphadenopathy [Normal Anterior Cervical Nodes] : no anterior cervical lymphadenopathy [No CVA Tenderness] : no CVA  tenderness [No Spinal Tenderness] : no spinal tenderness [No Joint Swelling] : no joint swelling [Grossly Normal Strength/Tone] : grossly normal strength/tone [No Rash] : no rash [Coordination Grossly Intact] : coordination grossly intact [No Focal Deficits] : no focal deficits [Normal Gait] : normal gait [Normal Affect] : the affect was normal [Normal Insight/Judgement] : insight and judgment were intact [Alert and Oriented x3] : oriented to person, place, and time [de-identified] : benign exam

## 2025-07-13 NOTE — HEALTH RISK ASSESSMENT
[0] : 2) Feeling down, depressed, or hopeless: Not at all (0) [PHQ-2 Negative - No further assessment needed] : PHQ-2 Negative - No further assessment needed [Never] : Never [[id number]] : 0

## 2025-07-13 NOTE — PHYSICAL EXAM
[No Acute Distress] : no acute distress [Well Nourished] : well nourished [Well Developed] : well developed [Well-Appearing] : well-appearing [Normal Sclera/Conjunctiva] : normal sclera/conjunctiva [PERRL] : pupils equal round and reactive to light [EOMI] : extraocular movements intact [Normal Outer Ear/Nose] : the outer ears and nose were normal in appearance [Normal Oropharynx] : the oropharynx was normal [No JVD] : no jugular venous distention [No Lymphadenopathy] : no lymphadenopathy [Supple] : supple [Thyroid Normal, No Nodules] : the thyroid was normal and there were no nodules present [No Respiratory Distress] : no respiratory distress  [No Accessory Muscle Use] : no accessory muscle use [Clear to Auscultation] : lungs were clear to auscultation bilaterally [Normal Rate] : normal rate  [Regular Rhythm] : with a regular rhythm [Normal S1, S2] : normal S1 and S2 [No Murmur] : no murmur heard [No Carotid Bruits] : no carotid bruits [No Varicosities] : no varicosities [Pedal Pulses Present] : the pedal pulses are present [No Edema] : there was no peripheral edema [No Extremity Clubbing/Cyanosis] : no extremity clubbing/cyanosis [Soft] : abdomen soft [Non Tender] : non-tender [Non-distended] : non-distended [Normal Bowel Sounds] : normal bowel sounds [Normal Posterior Cervical Nodes] : no posterior cervical lymphadenopathy [Normal Anterior Cervical Nodes] : no anterior cervical lymphadenopathy [No CVA Tenderness] : no CVA  tenderness [No Spinal Tenderness] : no spinal tenderness [No Joint Swelling] : no joint swelling [Grossly Normal Strength/Tone] : grossly normal strength/tone [No Rash] : no rash [Coordination Grossly Intact] : coordination grossly intact [No Focal Deficits] : no focal deficits [Normal Gait] : normal gait [Normal Affect] : the affect was normal [Normal Insight/Judgement] : insight and judgment were intact [Alert and Oriented x3] : oriented to person, place, and time [de-identified] : benign exam

## 2025-07-13 NOTE — ADDENDUM
[FreeTextEntry1] : This note was written by Rosalinda Dumont on 07/10/2025 acting as medical scribe for Dr. Gris Mercado. I, Dr. Gris Mercado, have read and attest that all the information, medical decision making and discharge instructions within are true and accurate.

## 2025-07-13 NOTE — HEALTH RISK ASSESSMENT
[0] : 2) Feeling down, depressed, or hopeless: Not at all (0) [PHQ-2 Negative - No further assessment needed] : PHQ-2 Negative - No further assessment needed [Never] : Never [[id number]] : 0